# Patient Record
Sex: MALE | Race: WHITE | NOT HISPANIC OR LATINO | Employment: FULL TIME | URBAN - METROPOLITAN AREA
[De-identification: names, ages, dates, MRNs, and addresses within clinical notes are randomized per-mention and may not be internally consistent; named-entity substitution may affect disease eponyms.]

---

## 2018-11-05 ENCOUNTER — HOSPITAL ENCOUNTER (EMERGENCY)
Facility: HOSPITAL | Age: 38
Discharge: HOME | End: 2018-11-05
Attending: EMERGENCY MEDICINE | Admitting: EMERGENCY MEDICINE

## 2018-11-05 VITALS
SYSTOLIC BLOOD PRESSURE: 110 MMHG | HEIGHT: 67 IN | HEART RATE: 76 BPM | DIASTOLIC BLOOD PRESSURE: 72 MMHG | RESPIRATION RATE: 16 BRPM | OXYGEN SATURATION: 95 % | BODY MASS INDEX: 32.52 KG/M2 | TEMPERATURE: 98.6 F | WEIGHT: 207.2 LBS

## 2018-11-05 DIAGNOSIS — S16.1XXA CERVICAL STRAIN, ACUTE, INITIAL ENCOUNTER: Primary | ICD-10-CM

## 2018-11-05 PROCEDURE — 99283 EMERGENCY DEPT VISIT LOW MDM: CPT

## 2018-11-05 RX ORDER — CYCLOBENZAPRINE HCL 10 MG
10 TABLET ORAL 3 TIMES DAILY PRN
Qty: 15 TABLET | Refills: 0 | Status: SHIPPED | OUTPATIENT
Start: 2018-11-05

## 2018-11-05 RX ORDER — HYDROCODONE BITARTRATE AND ACETAMINOPHEN 7.5; 325 MG/1; MG/1
1 TABLET ORAL ONCE
Status: COMPLETED | OUTPATIENT
Start: 2018-11-05 | End: 2018-11-05

## 2018-11-05 RX ORDER — CYCLOBENZAPRINE HCL 10 MG
10 TABLET ORAL ONCE
Status: COMPLETED | OUTPATIENT
Start: 2018-11-05 | End: 2018-11-05

## 2018-11-05 RX ORDER — NAPROXEN 500 MG/1
500 TABLET ORAL 2 TIMES DAILY PRN
Qty: 14 TABLET | Refills: 0 | Status: SHIPPED | OUTPATIENT
Start: 2018-11-05

## 2018-11-05 RX ADMIN — HYDROCODONE BITARTRATE AND ACETAMINOPHEN 1 TABLET: 7.5; 325 TABLET ORAL at 09:12

## 2018-11-05 RX ADMIN — CYCLOBENZAPRINE 10 MG: 10 TABLET, FILM COATED ORAL at 09:12

## 2018-11-05 NOTE — ED PROVIDER NOTES
MD ATTESTATION NOTE    The DAI and I have discussed this patient's history, physical exam, and treatment plan.  I have reviewed the documentation and personally had a face to face interaction with the patient. I affirm the documentation and agree with the treatment and plan.  The attached note describes my personal findings.      Pt presents to the ED c/o intermittent episodes of right-sided neck pain radiating to the right shoulder onset about 3 days ago. No known injury sustained the neck and RUE recently. Pt denies documented fever, focal weakness/numbness of the RUE, chest pain, and dyspnea.     On physical exam, pt is alert and oriented x3. There is point tenderness of the right trapezius muscle. No C-Spine tenderness. Pt is NV intact to the right arm.    Pt has been administered Norco and Flexeril in the ER, which pt states has improved his symptoms significantly. Pt was prescribed with rx for Flexeril and Naproxen and was advised to f/u with PMD.         Documentation assistance provided by Leslie Villareal. Information recorded by the scribe was done at my direction and has been verified and validated by me.     Entered by Leslie Villareal, acting as scribe for Dr. Terra MD.           Leslie Villareal  11/05/18 3906       Alex Ellison MD  11/05/18 4770

## 2018-11-05 NOTE — ED PROVIDER NOTES
"EMERGENCY DEPARTMENT ENCOUNTER    CHIEF COMPLAINT  Chief Complaint: R sided neck pain  History given by:Pt  History limited by:Nothing  Time Seen: 0857  Room Number: 26/26  PMD: Bud Jackson MD      HPI:  Pt is a 38 y.o. male who presents with R sided neck pain which radiates to his R shoulder, and began 3 days ago. The pt believes it is related to sleeping in the wrong position. The pt took old pain medication without relief.     Duration: 3 days  Timing:Constant  Location:R neck  Radiation:To R shoulder  Quality:\"pain\"  Intensity/Severity:Moderate  Progression:Worsening  Associated Symptoms:None  Aggravating Factors:Sleeping in uncomfortable position  Alleviating Factors:Unknown  Treatment before arrival:The pt took a pain pill without relief.     PAST MEDICAL HISTORY  Active Ambulatory Problems     Diagnosis Date Noted   • No Active Ambulatory Problems     Resolved Ambulatory Problems     Diagnosis Date Noted   • No Resolved Ambulatory Problems     Past Medical History:   Diagnosis Date   • Infarction of colon (CMS/HCC) 2003       PAST SURGICAL HISTORY  Past Surgical History:   Procedure Laterality Date   • COLONOSCOPY     • HERNIA REPAIR         FAMILY HISTORY  History reviewed. No pertinent family history.    SOCIAL HISTORY  Social History     Social History   • Marital status: Single     Spouse name: N/A   • Number of children: N/A   • Years of education: N/A     Occupational History   • Not on file.     Social History Main Topics   • Smoking status: Former Smoker     Types: Cigarettes   • Smokeless tobacco: Not on file   • Alcohol use Yes      Comment: occasional    • Drug use: Yes     Types: Marijuana   • Sexual activity: Defer     Other Topics Concern   • Not on file     Social History Narrative   • No narrative on file         ALLERGIES  Cat hair extract    REVIEW OF SYSTEMS  Review of Systems   Constitutional: Negative.  Negative for activity change, appetite change ( decreased), chills and fever. "   HENT: Negative for congestion, ear pain, rhinorrhea, sinus pressure and sore throat.    Eyes: Negative.    Respiratory: Negative.  Negative for cough and shortness of breath.    Cardiovascular: Negative.  Negative for chest pain, palpitations and leg swelling ( pedal).   Gastrointestinal: Negative for abdominal pain, diarrhea, nausea and vomiting.   Endocrine: Negative.    Genitourinary: Negative.  Negative for decreased urine volume, difficulty urinating, dysuria, frequency and urgency.   Musculoskeletal: Positive for myalgias and neck pain (R sided). Negative for back pain.   Skin: Negative.  Negative for rash.   Allergic/Immunologic: Negative.    Neurological: Negative.  Negative for dizziness, weakness, light-headedness, numbness and headaches.   Hematological: Negative.    Psychiatric/Behavioral: Negative.  The patient is not nervous/anxious.    All other systems reviewed and are negative.      PHYSICAL EXAM  ED Triage Vitals   Temp Heart Rate Resp BP SpO2   11/05/18 0845 11/05/18 0844 11/05/18 0844 -- 11/05/18 0849   98.6 °F (37 °C) 101 20  95 %      Temp src Heart Rate Source Patient Position BP Location FiO2 (%)   -- 11/05/18 0844 -- -- --    Monitor          Physical Exam   Constitutional: He is well-developed, well-nourished, and in no distress. No distress.   HENT:   Head: Normocephalic.   Eyes: Pupils are equal, round, and reactive to light.   Musculoskeletal:        Cervical back: He exhibits decreased range of motion, tenderness ( right paraspinal), pain and spasm. He exhibits no bony tenderness.   Skin: Skin is warm and dry. No rash noted.   Psychiatric: Mood, memory, affect and judgment normal.   Nursing note and vitals reviewed.        PROGRESS AND CONSULTS  0858 Discussed that I believe the pt's symptoms are muscular and there is no need for an XR. Discussed that I will order the pt medication for his pain. The pt agrees with the plan and all questions were addressed.    0905 Ordered flexeril and  "norco for pain.     1000 Reviewed pt's history and workup with Dr. Ellison. After a bedside evaluation; Dr Ellison agrees with the plan of care    1002 Rechecked the pt, who is feeling improved. Discussed the plan to discharge the pt. The pt agrees with the plan and all questions were addressed.       MEDICATIONS GIVEN IN ER  Medications   HYDROcodone-acetaminophen (NORCO) 7.5-325 MG per tablet 1 tablet (1 tablet Oral Given 11/5/18 0912)   cyclobenzaprine (FLEXERIL) tablet 10 mg (10 mg Oral Given 11/5/18 0912)       /72   Pulse 76   Temp 98.6 °F (37 °C)   Resp 16   Ht 170.2 cm (67\")   Wt 94 kg (207 lb 3.2 oz)   SpO2 95%   BMI 32.45 kg/m²     Discussed all results and noted any abnormalities with patient.  Discussed absoute need to recheck abnormalities with PCP    Reviewed implications of results, diagnosis, meds, responsibility to follow up, warning signs and symptoms of possible worsening, potential complications and reasons to return to ER with patient    Discussed plan for discharge, as there is no emergent indication for admission.  Pt is agreeable and understands need for follow up and repeat testing.  Pt is aware that discharge does not mean that nothing is wrong but it indicates no emergency is present and they must continue care with PCP.  Pt is discharged with instructions to follow up with primary care doctor to have their blood pressure rechecked.       DIAGNOSIS  Final diagnoses:   Cervical strain, acute, initial encounter       FOLLOW UP   Bud Jackson MD  [address] [id number] [phone]    Schedule an appointment as soon as possible for a visit         RX     Medication List      New Prescriptions    cyclobenzaprine 10 MG tablet  Commonly known as:  FLEXERIL  Take 1 tablet by mouth 3 (Three) Times a Day As Needed for Muscle Spasms.     naproxen 500 MG tablet  Commonly known as:  NAPROSYN  Take 1 tablet by mouth 2 (Two) Times a Day As Needed for Moderate Pain " .            I personally reviewed the past medical history, past surgical history, social history, family history, current medications and allergies as they appear in this chart.  The scribe's note accurately reflects the work and decisions made by me.         Documentation assistance provided by alanis Bruce for NARCISA Mojica.  Information recorded by the scribe was done at my direction and has been verified and validated by me.       Marlyn Bruce  11/05/18 0922       Marlyn Bruce  11/05/18 1018       Eleni Prieto APRN  11/05/18 1219

## 2021-03-31 ENCOUNTER — BULK ORDERING (OUTPATIENT)
Dept: CASE MANAGEMENT | Facility: OTHER | Age: 41
End: 2021-03-31

## 2021-03-31 DIAGNOSIS — Z23 IMMUNIZATION DUE: ICD-10-CM
